# Patient Record
Sex: MALE | Race: WHITE | NOT HISPANIC OR LATINO | URBAN - METROPOLITAN AREA
[De-identification: names, ages, dates, MRNs, and addresses within clinical notes are randomized per-mention and may not be internally consistent; named-entity substitution may affect disease eponyms.]

---

## 2021-11-06 ENCOUNTER — EMERGENCY (EMERGENCY)
Facility: HOSPITAL | Age: 21
LOS: 1 days | Discharge: HOME | End: 2021-11-06
Attending: EMERGENCY MEDICINE
Payer: OTHER GOVERNMENT

## 2021-11-06 VITALS
RESPIRATION RATE: 18 BRPM | HEIGHT: 70 IN | HEART RATE: 79 BPM | SYSTOLIC BLOOD PRESSURE: 108 MMHG | TEMPERATURE: 99 F | DIASTOLIC BLOOD PRESSURE: 60 MMHG | OXYGEN SATURATION: 95 % | WEIGHT: 199.96 LBS

## 2021-11-06 DIAGNOSIS — Z90.89 ACQUIRED ABSENCE OF OTHER ORGANS: Chronic | ICD-10-CM

## 2021-11-06 PROCEDURE — 99283 EMERGENCY DEPT VISIT LOW MDM: CPT

## 2021-11-06 PROCEDURE — 36415 COLL VENOUS BLD VENIPUNCTURE: CPT

## 2021-11-06 PROCEDURE — 94640 AIRWAY INHALATION TREATMENT: CPT

## 2021-11-06 PROCEDURE — 0225U NFCT DS DNA&RNA 21 SARSCOV2: CPT

## 2021-11-06 PROCEDURE — 86308 HETEROPHILE ANTIBODY SCREEN: CPT

## 2021-11-06 PROCEDURE — 99283 EMERGENCY DEPT VISIT LOW MDM: CPT | Mod: 25

## 2021-11-06 RX ORDER — IBUPROFEN 200 MG
600 TABLET ORAL ONCE
Refills: 0 | Status: COMPLETED | OUTPATIENT
Start: 2021-11-06 | End: 2021-11-06

## 2021-11-06 RX ORDER — ALBUTEROL 90 UG/1
2 AEROSOL, METERED ORAL EVERY 6 HOURS
Refills: 0 | Status: DISCONTINUED | OUTPATIENT
Start: 2021-11-06 | End: 2021-11-11

## 2021-11-06 RX ADMIN — Medication 1 TABLET(S): at 23:05

## 2021-11-06 RX ADMIN — Medication 600 MILLIGRAM(S): at 23:05

## 2021-11-06 RX ADMIN — ALBUTEROL 2 PUFF(S): 90 AEROSOL, METERED ORAL at 23:05

## 2021-11-06 NOTE — ED PROVIDER NOTE - CLINICAL SUMMARY MEDICAL DECISION MAKING FREE TEXT BOX
22 yo M c/o cough x3 days and abrupt R ear pain today 20 y/o M with PSHx of tonsillectomy presents with cough with yellow sputum, runny nose, sore throat and right ear pain x 3 days, + exposure to mono, no measured fever but reports chills. Exam with nasal congestion, lungs clear no wheezing, R otitis; will tx for otitis, symptomatic tx for cough, also check COVID test and mono screen  -Discussed results, plan and return precautions with patient, pt verbalized understanding and agreement of plan

## 2021-11-06 NOTE — ED PROVIDER NOTE - PROGRESS NOTE DETAILS
GARETH Raymundo NOTE: Pt evaluated at bedside. 20 y/o M with PSHx of tonsillectomy presents with cough with yellow sputum, runny nose, sore throat and right ear pain x 3 days, + exposure to mono, no measured fever but reports chills.     PE: AOx3, well appearing, non toxic, Auricles/tragi/mastoid non-tender b/l.  Left TM and ear canal normal b/l without erythema or bulging.  Right TM bulging and erythematous. Conjunctiva and sclera clear b/l, EOMI, no ocular redness, discharge or swelling. +rhinorrhea, no sinus tenderness, Mouth and pharynx with normal mucosa, no erythema, exudate or vesicles. Uvula midline. Neck supple. +S1/S2, peripheral pulse 2+ b/l. Lungs CTA b/l. Abd soft NTND no rebound/guarding no CVAT. Moves all extremities spontaneously/symmetrically.     Pt evaluated prior by physician. Otherwise HPI/PE/ROS as noted above. Will follow up plan per intake physician.

## 2021-11-06 NOTE — ED PROVIDER NOTE - CARE PLAN
1 Principal Discharge DX:	Acute infection of right ear  Secondary Diagnosis:	Upper respiratory infection

## 2021-11-06 NOTE — ED PROVIDER NOTE - PATIENT PORTAL LINK FT
You can access the FollowMyHealth Patient Portal offered by [facility] by registering at the following website: [email or web address]. By joining [facility]’s FollowMyHealth portal, you will also be able to view your health information using other applications (apps) compatible with our system.

## 2021-11-06 NOTE — ED PROVIDER NOTE - OBJECTIVE STATEMENT
21 year old M w/ no PMH of tonsillectomy c/o productive cough x3 days and R ear pain x5 hrs. Pt states he has "felt overall sick" for past week, initially having a sore throat that partially resolved and now cough with yellow sputum. Pt had abrupt onset R ear pain after coughing tonight, and feels that it is pressurized 2/2 to nasal congestion. Admits to sick contacts including friend with recent dx [place]. Pt came in to ED tonight bc feels that he is getting worse. Did not take temperature, but admits to feeling hot/chills on and off.  Denies HA, pain in sinuses, hearing deficit, abdominal pain, N/V/D/C.

## 2021-11-06 NOTE — ED PROVIDER NOTE - ATTENDING CONTRIBUTION TO CARE
20 y/o M with PSHx of tonsillectomy presents with cough with yellow sputum, runny nose, sore throat and right ear pain x 3 days, + exposure to mono, no measured fever but reports chills. Exam with nasal congestion, lungs clear no wheezing, R otitis; will tx for otitis, symptomatic tx for cough, also check COVID test and mono screen  -Discussed results, plan and return precautions with patient, pt verbalized understanding and agreement of plan21 year old M w/ no PMH of tonsillectomy c/o productive cough x3 days and R ear pain x5 hrs

## 2021-11-06 NOTE — ED PROVIDER NOTE - NSFOLLOWUPINSTRUCTIONS_ED_ALL_ED_FT
READ ALL ATTACHED INSTRUCTIONS FOR CORONAVIRUS IMMEDIATELY   - You were tested for COVID-19 (Coronavirus) during your visit to Emergency Department.   - Do not return to work/school/public areas/public transit until you have tested negative for COVID-19.  - Results will be available in 1-2 days. Self quarantine until COVID-19 results available.  - Monitor your symptoms using symptom tracker.  - Practice social distancing and avoid contact with others. Avoid sharing personal household items.   - Practice proper hand hygiene. Disinfect surfaces frequently. Cover your coughs and sneezes.   - Stay hydrated.      SEEK IMMEDIATE MEDICAL CARE IF YOU HAVE ANY OF THE FOLLOWING SYMPTOMS  **Seek immediate medicate attention if you develop new/worsening, signs/symptoms or concerns including, but not limited to shortness of breath, difficulty breathing, chest pain, confusion, severe weakness.**    If you believe you are experiencing a medical emergency call 9-1-1.       Ear Infection    WHAT YOU NEED TO KNOW:    An ear infection is also called otitis media. Blocked or swollen eustachian tubes can cause an infection. Eustachian tubes connect the middle ear to the back of the nose and throat. They drain fluid from the middle ear. You may have a buildup of fluid in your ear. Germs build up in the fluid and infection develops.    Ear Anatomy         DISCHARGE INSTRUCTIONS:    Return to the emergency department if:   •You have clear fluid coming from your ear.      •You have a stiff neck, headache, and a fever.      Call your doctor if:   •You see blood or pus draining from your ear.      •Your ear pain gets worse or does not go away, even after treatment.      •The outside of your ear is red or swollen.      •You are vomiting or have diarrhea.      •You have questions or concerns about your condition or care.      Medicines: You may need any of the following:  •Acetaminophen decreases pain and fever. It is available without a doctor's order. Ask how much to take and how often to take it. Follow directions. Read the labels of all other medicines you are using to see if they also contain acetaminophen, or ask your doctor or pharmacist. Acetaminophen can cause liver damage if not taken correctly. Do not use more than 4 grams (4,000 milligrams) total of acetaminophen in one day.       •NSAIDs, such as ibuprofen, help decrease swelling, pain, and fever. This medicine is available with or without a doctor's order. NSAIDs can cause stomach bleeding or kidney problems in certain people. If you take blood thinner medicine, always ask your healthcare provider if NSAIDs are safe for you. Always read the medicine label and follow directions.      •Ear drops may contain medicine to decrease pain and inflammation.      •Antibiotics help treat a bacterial infection.      •Take your medicine as directed. Contact your healthcare provider if you think your medicine is not helping or if you have side effects. Tell him or her if you are allergic to any medicine. Keep a list of the medicines, vitamins, and herbs you take. Include the amounts, and when and why you take them. Bring the list or the pill bottles to follow-up visits. Carry your medicine list with you in case of an emergency.      Self-care:   •Apply heat on your ear for 15 to 20 minutes, 3 to 4 times a day or as directed. You can apply heat with an electric heating pad, hot water bottle, or warm compress. Always put a cloth between your skin and the heat pack to prevent burns. Heat helps decrease pain.      •Apply ice on your ear for 15 to 20 minutes, 3 to 4 times a day for 2 days or as directed. Use an ice pack, or put crushed ice in a plastic bag. Cover it with a towel before you apply it to your ear. Ice decreases swelling and pain.      Prevent an ear infection:   •Wash your hands often to help prevent the spread of germs. Ask everyone in your house to wash their hands with soap and water. Ask them to wash after they use the bathroom or change a diaper. Remind them to wash before they prepare or eat food.  Handwashing           •Stay away from people who are ill. Some germs spread easily and quickly through contact.      Follow up with your doctor as directed: Write down your questions so you remember to ask them during your visits.      Upper Respiratory Infection    WHAT YOU NEED TO KNOW:    An upper respiratory infection is also called a cold. It can affect your nose, throat, ears, and sinuses. Cold symptoms are usually worst for the first 3 to 5 days. Most people get better in 7 to 14 days. You may continue to cough for 2 to 3 weeks. Colds are caused by viruses and do not get better with antibiotics.    DISCHARGE INSTRUCTIONS:    Call your local emergency number (911 in the [place]) if:   •You have chest pain or trouble breathing.          Return to the emergency department if:   •You have a fever over 102ºF (39ºC).          Call your doctor if:   •You have a low fever.      •Your sore throat gets worse or you see white or yellow spots in your throat.      •Your symptoms get worse after 3 to 5 days or are not better in 14 days.      •You have a rash anywhere on your skin.      •You have large, tender lumps in your neck.      •You have thick, green, or yellow drainage from your nose.      •You cough up thick yellow, green, or bloody mucus.      •You have a bad earache.      •You have questions or concerns about your condition or care.      Medicines: You may need any of the following:   •Decongestants help reduce nasal congestion and help you breathe more easily. If you take decongestant pills, they may make you feel restless or cause problems with your sleep. Do not use decongestant sprays for more than a few days.      •Cough suppressants help reduce coughing. Ask your healthcare provider which type of cough medicine is best for you.       •NSAIDs, such as ibuprofen, help decrease swelling, pain, and fever. NSAIDs can cause stomach bleeding or kidney problems in certain people. If you take blood thinner medicine, always ask your healthcare provider if NSAIDs are safe for you. Always read the medicine label and follow directions.      •Acetaminophen decreases pain and fever. It is available without a doctor's order. Ask how much to take and how often to take it. Follow directions. Read the labels of all other medicines you are using to see if they also contain acetaminophen, or ask your doctor or pharmacist. Acetaminophen can cause liver damage if not taken correctly. Do not use more than 4 grams (4,000 milligrams) total of acetaminophen in one day.       •Take your medicine as directed. Contact your healthcare provider if you think your medicine is not helping or if you have side effects. Tell him or her if you are allergic to any medicine. Keep a list of the medicines, vitamins, and herbs you take. Include the amounts, and when and why you take them. Bring the list or the pill bottles to follow-up visits. Carry your medicine list with you in case of an emergency.      Self-care:   •Rest as much as possible. Slowly start to do more each day.      •Drink more liquids as directed. Liquids will help thin and loosen mucus so you can cough it up. Liquids will also help prevent dehydration. Liquids that help prevent dehydration include water, fruit juice, and broth. Do not drink liquids that contain caffeine. Caffeine can increase your risk for dehydration. Ask your healthcare provider how much liquid to drink each day.      •Soothe a sore throat. Gargle with warm salt water. Make salt water by dissolving ¼ teaspoon salt in 1 cup warm water. You may also suck on hard candy or throat lozenges. You may use a sore throat spray.      •Use a humidifier or vaporizer. Use a cool mist humidifier or a vaporizer to increase air moisture in your home. This may make it easier for you to breathe and help decrease your cough.      •Use saline nasal drops as directed. These help relieve congestion.      •Apply petroleum-based jelly around the outside of your nostrils. This can decrease irritation from blowing your nose.      •Do not smoke. Nicotine and other chemicals in cigarettes and cigars can make your symptoms worse. They can also cause infections such as bronchitis or pneumonia. Ask your healthcare provider for information if you currently smoke and need help to quit. E-cigarettes or smokeless tobacco still contain nicotine. Talk to your healthcare provider before you use these products.      Prevent a cold:   •Wash your hands often. Use soap and water every time you wash your hands. Rub your soapy hands together, lacing your fingers. Use the fingers of one hand to scrub under the nails of the other hand. Wash for at least 20 seconds. Rinse with warm, running water for several seconds. Then dry your hands. Use germ-killing gel if soap and water are not available. Do not touch your eyes or mouth without washing your hands first.   Handwashing           •Cover a sneeze or cough. Use a tissue that covers your mouth and nose. Put the used tissue in the trash right away. Use the bend of your arm if a tissue is not available. Wash your hands well with soap and water or use a hand . Do not stand close to anyone who is sneezing or coughing.      •Try to stay away from others while you are sick. This is especially important during the first 2 to 3 days when the virus is more easily spread. Wait until a fever, cough, or other symptoms are gone before you return to work or other regular activities.      •Do not share items while you are sick. This includes food, drinks, eating utensils, and dishes.      Follow up with your doctor as directed: Write down your questions so you remember to ask them during your visits.

## 2021-11-06 NOTE — ED PROVIDER NOTE - CARDIAC, MLM
Agree with golytely, then resume lactulose as per your recommendations. I suspect CT finding is spasm/peristalsis, with unremarkable prior BE and C-scope. I will review pt's status with pt/family when he presents for EGD next week.   Normal rate, regular rhythm.  Heart sounds S1, S2.

## 2021-11-07 LAB
HETEROPH AB TITR SER AGGL: NEGATIVE — SIGNIFICANT CHANGE UP
RAPID RVP RESULT: DETECTED
RV+EV RNA SPEC QL NAA+PROBE: DETECTED
SARS-COV-2 RNA SPEC QL NAA+PROBE: SIGNIFICANT CHANGE UP

## 2021-11-07 RX ORDER — FLUTICASONE PROPIONATE 50 MCG
1 SPRAY, SUSPENSION NASAL
Qty: 1 | Refills: 0
Start: 2021-11-07 | End: 2021-11-13

## 2025-04-27 NOTE — ED PROVIDER NOTE - IV ALTEPLASE DOOR HIDDEN
15-year-old male with persistent cough.  PE as above.    Chest x-ray, symptomatic control, reassess. show